# Patient Record
Sex: MALE | Race: WHITE | NOT HISPANIC OR LATINO | Employment: OTHER | URBAN - METROPOLITAN AREA
[De-identification: names, ages, dates, MRNs, and addresses within clinical notes are randomized per-mention and may not be internally consistent; named-entity substitution may affect disease eponyms.]

---

## 2017-07-07 ENCOUNTER — OFFICE VISIT (OUTPATIENT)
Dept: OPHTHALMOLOGY | Facility: CLINIC | Age: 67
End: 2017-07-07
Payer: COMMERCIAL

## 2017-07-07 DIAGNOSIS — H01.001 BLEPHARITIS OF UPPER EYELIDS OF BOTH EYES, UNSPECIFIED TYPE: ICD-10-CM

## 2017-07-07 DIAGNOSIS — H01.004 BLEPHARITIS OF UPPER EYELIDS OF BOTH EYES, UNSPECIFIED TYPE: ICD-10-CM

## 2017-07-07 DIAGNOSIS — Z83.518 FAMILY HISTORY OF MACULAR DEGENERATION: ICD-10-CM

## 2017-07-07 DIAGNOSIS — H52.03 HYPEROPIA, BILATERAL: ICD-10-CM

## 2017-07-07 DIAGNOSIS — H25.13 NUCLEAR SCLEROSIS OF BOTH EYES: Primary | ICD-10-CM

## 2017-07-07 DIAGNOSIS — H11.001 PTERYGIUM, RIGHT: ICD-10-CM

## 2017-07-07 DIAGNOSIS — Z46.0 ENCOUNTER FOR FITTING OR ADJUSTMENT OF SPECTACLES OR CONTACT LENSES: ICD-10-CM

## 2017-07-07 DIAGNOSIS — H52.4 BILATERAL PRESBYOPIA: ICD-10-CM

## 2017-07-07 PROCEDURE — 92014 COMPRE OPH EXAM EST PT 1/>: CPT | Mod: S$GLB,,, | Performed by: OPTOMETRIST

## 2017-07-07 PROCEDURE — 92310 CONTACT LENS FITTING OU: CPT | Mod: ,,, | Performed by: OPTOMETRIST

## 2017-07-07 PROCEDURE — 99999 PR PBB SHADOW E&M-EST. PATIENT-LVL II: CPT | Mod: PBBFAC,,, | Performed by: OPTOMETRIST

## 2017-07-07 PROCEDURE — 92015 DETERMINE REFRACTIVE STATE: CPT | Mod: S$GLB,,, | Performed by: OPTOMETRIST

## 2017-07-07 RX ORDER — NEOMYCIN SULFATE, POLYMYXIN B SULFATE AND DEXAMETHASONE 3.5; 10000; 1 MG/ML; [USP'U]/ML; MG/ML
1 SUSPENSION/ DROPS OPHTHALMIC 4 TIMES DAILY
Qty: 10 ML | Refills: 1 | Status: SHIPPED | OUTPATIENT
Start: 2017-07-07 | End: 2017-07-14

## 2017-07-07 NOTE — PROGRESS NOTES
HPI     Patient states right eye hurting with mucus in both eyes x 3-4 days. Pain   scale 3. Both eyes feels irritated. Last eye exam 06/17/2016 [facility].  Patient   wears contact lenses.     Last edited by Leslie Zuniga on 7/7/2017 11:09 AM. (History)            Assessment /Plan     For exam results, see Encounter Report.    Nuclear sclerosis of both eyes    Family history of macular degeneration    Pterygium, right    Blepharitis of upper eyelids of both eyes, unspecified type    Encounter for fitting or adjustment of spectacles or contact lenses    Hyperopia, bilateral    Bilateral presbyopia    Other orders  -     neomycin-polymyxin-dexamethasone (MAXITROL) 3.5mg/mL-10,000 unit/mL-0.1 % DrpS; Place 1 drop into both eyes 4 (four) times daily.  Dispense: 10 mL; Refill: 1      Minimal cataracts OU, not surgical    No macular pathology    Stable pterygium    Blepharitis to be treated with maxitrol qid OU, worksheet given for warm compresses and baby shampoo.    Discussed CL charges.  Dispense Final Rx for glasses  No changes CL Rx  RTC 1 year

## 2022-02-24 ENCOUNTER — TELEPHONE (OUTPATIENT)
Dept: HEMATOLOGY/ONCOLOGY | Facility: CLINIC | Age: 72
End: 2022-02-24
Payer: COMMERCIAL

## 2022-02-25 ENCOUNTER — TELEPHONE (OUTPATIENT)
Dept: HEMATOLOGY/ONCOLOGY | Facility: CLINIC | Age: 72
End: 2022-02-25
Payer: COMMERCIAL

## 2022-02-25 DIAGNOSIS — C83.30 DIFFUSE LARGE B-CELL LYMPHOMA, UNSPECIFIED BODY REGION: Primary | ICD-10-CM

## 2022-02-25 NOTE — LETTER
Fax Transmission   Date:   22      To:           JAM; PGL                       From:  Anila Copeland, BMT Lay Navigator   Fax:  [phone]                   Fax:      [phone]   Phone:    [phone]   Phone:  [phone] (Lay Navigator)               [phone] (Pathology lab)       TIME SENSITIVE - Appointment Scheduled for _____  IF THERE ARE ANY PROBLEMS WITH THIS TRANSMISSION, PLEASE CALL IMMEDIATELY. THANK YOU.    Patient:  Melvin Tariq__   _[id number]_Liawddvbo: DLCL    Specimen ID: _____________  Date Collected: _21_  Type of Specimen: _bone marrow__        Please send the following:   Entire case, including  o All glass slides   o 10 unstained slides of tumor   Pathology reports (to include results of cytogenetic, molecular studies, next gen sequencing, flow cytometry, FISH and other tests, if any)    PLEASE FAX PATH REPORT -[phone] AT THE TIME OF SHIPPING SPECIMEN    See attached [employer] label to use for shipping, tracking number ____________    Shipping to:  [facility] - URGENT SPECIMEN ALERT  [facility] 4th floor  [address] [place] [address]    CONFIDENTIALITY NOTICE: The accompanying facsimile is intended solely for the use of the recipient designated above. Document(s) transmitted herewith may contain information that is confidential and privileged. Delivery, distribution or dissemination of this communication other than to the intended recipient is strictly prohibited. If you have received this facsimile in error, please notify [facility]'s Corporate Integrity Department immediately by telephone at [phone].

## 2022-02-25 NOTE — TELEPHONE ENCOUNTER
----- Message from Colby Little sent at 2/24/2022 12:50 PM CST -----  Regarding: RE: SCT Benefits Check  Good afternoon Merary,    I have verified medical and transplant benefits for patient for Sal Patel.    Thanks  Colby     ----- Message -----  From: Merary Mcdonald RN  Sent: 2/24/2022  11:18 AM CST  To: Janusz Godoy, Colby Little  Subject: SCT Benefits Check                               Hello,    Please complete benefit check for patient. Medical benefit check and transplant benefit check for Sal Patel only.    Auto    Thank you,  Merary

## 2022-02-25 NOTE — LETTER
Date:   22    Department of Hematology/Oncology & BMT, Patient Navigation                         From:   Anila Copeland, BMT Lay Navigator [email or web address]                   Special Instructions:                         Please send the following:     ?   ? Imaging CD for recent PET scan     To  JAM         Fax:    [phone]    Phone:  [phone]                   Re:                     Name:   Melvin Thurman                        :  52                MRN#:   [id number]                            Number of Pages (including cover):     Phone: [phone] Fax: [phone]   Please mail CD imaging to:    Attention; Anila Copeland [address] [place] [address]                                                   If there are any problems with this transmission, please call immediately. Thank you.  Confidentiality notice: The accompanying facsimile is intended solely for the use of the recipient designated above. Document(s) transmitted herewith may contain information that is confidential and privileged. Delivery, distribution or dissemination of this communication other than to the intended recipient is strictly prohibited. If you have received this facsimile in error, please notify us immediately by telephone.

## 2022-02-28 ENCOUNTER — TELEPHONE (OUTPATIENT)
Dept: HEMATOLOGY/ONCOLOGY | Facility: CLINIC | Age: 72
End: 2022-02-28
Payer: COMMERCIAL

## 2022-02-28 DIAGNOSIS — C83.30 DIFFUSE LARGE B-CELL LYMPHOMA, UNSPECIFIED BODY REGION: Primary | ICD-10-CM

## 2022-02-28 NOTE — TELEPHONE ENCOUNTER
Pt confirmed integrative virtual appt 3/14 at 830. Pt just started new chemo. No side effects or concerns at this time. He is interested in holistic services. Has first appt with  this week.

## 2022-03-02 NOTE — PROGRESS NOTES
Oncology Nutrition Assessment for Medical Nutrition Therapy  Initial Visit    Melvin Thurman   1950    Referring Provider: Mariya Akins MD      Reason for Visit: nutrition counseling and education    The patient location is: home  The chief complaint leading to consultation is: nutrition referral    Visit type: audiovisual    Face to Face time with patient: 60 minutes  70 minutes of total time spent on the encounter, which includes face to face time and non-face to face time preparing to see the patient (eg, review of tests), Obtaining and/or reviewing separately obtained history, Documenting clinical information in the electronic or other health record, Independently interpreting results (not separately reported) and communicating results to the patient/family/caregiver, or Care coordination (not separately reported).     Each patient to whom he or she provides medical services by telemedicine is:  (1) informed of the relationship between the physician and patient and the respective role of any other health care provider with respect to management of the patient; and (2) notified that he or she may decline to receive medical services by telemedicine and may withdraw from such care at any time.    PMHx:   Past Medical History:   Diagnosis Date    Pterygium of right eye        Nutrition Assessment    This is a 71 y.o.male with a medical diagnosis of DLBCL s/p 6 cycles R-CHOP. He reports that he just started salvage chemo and met with Dr. Akins this morning to discuss plan (CAR T vs. SCT). He reports that overall his appetite is good. He eats 3 meals/day and snacks periodically. His meals are usually home prepared by his wife. They are very focused on overall healthy eating (avoiding processed foods and artificial sweeteners, limiting overall carbohydrate intake, choosing whole grains, avoiding refined sugar). He has protein with most meals, usually eggs, red meat, chicken, or seafood. He is well aware of food  "safety guidelines and is following these. He also snacks on peanuts. He drinks mostly water and some Roar organic drinks (coconut water based). He has been supplementing with Ensure and Ensure Plus some after he was introduced to them in the hospital. He does also make smoothies with whey protein, frozen fruit, and walnuts.    Weight: 93.3 kg (205 lb 9.6 oz) - 2/28 clinic note  Height: 6' 5.01" (1.956 m)  BMI: 24.37    Usual BW: 205lb  Weight Change: stable    Allergies: Patient has no known allergies.    Current Medications:  Current Outpatient Medications:     acyclovir (ZOVIRAX) 800 MG Tab, Take 0.5 tablets by mouth 2 (two) times daily., Disp: 60 tablet, Rfl: 5    acyclovir (ZOVIRAX) 800 MG Tab, Take 0.5 tablets by mouth 2 (two) times daily., Disp: 60 tablet, Rfl: 5    allopurinoL (ZYLOPRIM) 300 MG tablet, Take 1 tablet by mouth daily., Disp: 60 tablet, Rfl: 0    allopurinoL (ZYLOPRIM) 300 MG tablet, Take 1 tablet by mouth daily., Disp: 60 tablet, Rfl: 0    bisacodyL (DULCOLAX) 5 mg EC tablet, Take 2 tablets by mouth 2 (two) times daily as needed for Constipation., Disp: 60 tablet, Rfl: 0    ciprofloxacin HCl (CIPRO) 500 MG tablet, Take 1 tablet by mouth 2 (two) times daily for 7 days., Disp: 14 tablet, Rfl: 0    dexAMETHasone (DECADRON) 4 MG Tab, Take 10 tablets by mouth Take daily with breakfast. For days starting with chemo, Disp: 40 tablet, Rfl: 2    diazePAM (VALIUM) 5 MG tablet, Take 1 (one) tablet by mouth single dose as directed 30 minutes before scan, Disp: 4 tablet, Rfl: 0    gabapentin (NEURONTIN) 100 MG capsule, Take 1 capsule by mouth three times a day with meals, Disp: 90 capsule, Rfl: 3    HYDROcodone-acetaminophen (NORCO) 5-325 mg per tablet, Take 1 tablet by mouth every 6 (six) hours as needed., Disp: 30 tablet, Rfl: 0    levoFLOXacin (LEVAQUIN) 500 MG tablet, Take 1 tablet by mouth daily for 10 days., Disp: 10 tablet, Rfl: 0    levoFLOXacin (LEVAQUIN) 750 MG tablet, Take 1 tablet by " mouth daily for 5 days., Disp: 5 tablet, Rfl: 0    LIDOcaine-prilocaine (EMLA) cream, Apply topically once prior to mediport use., Disp: 30 g, Rfl: 0    ondansetron (ZOFRAN-ODT) 4 MG TbDL, Take 1 tablet by mouth every 8 (eight) hours as needed for Nausea, Disp: 20 tablet, Rfl: 3    pantoprazole (PROTONIX) 40 MG tablet, Take 1 tablet by mouth every morning., Disp: 30 tablet, Rfl: 11    pantoprazole (PROTONIX) 40 MG tablet, Take 1 tablet by mouth every morning., Disp: 30 tablet, Rfl: 3    pantoprazole (PROTONIX) 40 MG tablet, Take 1 tablet by mouth every morning., Disp: 30 tablet, Rfl: 3    predniSONE (DELTASONE) 50 MG Tab, Take 2 tablets by mouth daily., Disp: 10 tablet, Rfl: 5    predniSONE (DELTASONE) 50 MG Tab, Take 2 tablets by mouth daily., Disp: 10 tablet, Rfl: 5    pregabalin (LYRICA) 100 MG capsule, Take 1 capsule by mouth twice a day, Disp: 60 capsule, Rfl: 3    pregabalin (LYRICA) 50 MG capsule, Take 1 capsule by mouth 2 (two) times daily., Disp: 60 capsule, Rfl: 3    pregabalin (LYRICA) 50 MG capsule, Take 1 capsule by mouth 2 (two) times daily., Disp: 60 capsule, Rfl: 3    promethazine (PHENERGAN) 25 MG tablet, Take 1 tablet by mouth every 6 hours as needed for nausea for up to 7 days, Disp: 30 tablet, Rfl: 0    sulfamethoxazole-trimethoprim 800-160mg (BACTRIM DS) 800-160 mg Tab, Take 1 tablet by mouth 3 (three) times a week., Disp: 12 tablet, Rfl: 5    sulfamethoxazole-trimethoprim 800-160mg (BACTRIM DS) 800-160 mg Tab, Take 1 tablet by mouth 3 (three) times a week., Disp: 12 tablet, Rfl: 5    Food/medication interactions noted: none    Vitamins/Supplements: krill oil, CoQ10, magnesium, folate, zinc, selenium, beta-carotene    Labs: Reviewed    Nutrition Diagnosis    Problem: nutrition-related knowledge deficit  Etiology (related to): lack of prior need for nutrition education  Signs/Symptoms (as evidenced by): DLBCL diagnosis    Nutrition Intervention    Nutrition Prescription   2333 kcals  (25kcal/kg)  93g protein (1g/kg)   2333mL fluid (25mL/kg)    Recommendations:  Continue to eat 3 meals/day and snacks  Continue to include protein at all meals/snacks  Continue to drink at least 64oz fluid/day  Addressed several questions and concerns  Discussed overall healthy diet - plant-based, lean meats, increased fruits and vegetables, etc.  Reinforced food safety guidelines    Nutrition Monitoring and Evaluation    Monitor: diet education needs, energy intake and weight status    Goals: weight maintenance    Follow up: Patient provided with dietitian contact information and advised to call/message with questions or to make future appointment if further intervention is needed.    Communication to referring provider/care team: note available in chart    eClia Silverio MS, RD, LDN  [phone]

## 2022-03-03 ENCOUNTER — CLINICAL SUPPORT (OUTPATIENT)
Dept: HEMATOLOGY/ONCOLOGY | Facility: CLINIC | Age: 72
End: 2022-03-03
Payer: COMMERCIAL

## 2022-03-03 ENCOUNTER — OFFICE VISIT (OUTPATIENT)
Dept: HEMATOLOGY/ONCOLOGY | Facility: CLINIC | Age: 72
End: 2022-03-03
Payer: COMMERCIAL

## 2022-03-03 DIAGNOSIS — C83.30 DIFFUSE LARGE B-CELL LYMPHOMA, UNSPECIFIED BODY REGION: Primary | ICD-10-CM

## 2022-03-03 DIAGNOSIS — Z71.3 NUTRITIONAL COUNSELING: ICD-10-CM

## 2022-03-03 PROCEDURE — 99205 PR OFFICE/OUTPT VISIT, NEW, LEVL V, 60-74 MIN: ICD-10-PCS | Mod: 95,,, | Performed by: INTERNAL MEDICINE

## 2022-03-03 PROCEDURE — 97802 PR MED NUTR THER, 1ST, INDIV, EA 15 MIN: ICD-10-PCS | Mod: 95,,, | Performed by: DIETITIAN, REGISTERED

## 2022-03-03 PROCEDURE — 97802 MEDICAL NUTRITION INDIV IN: CPT | Mod: 95,,, | Performed by: DIETITIAN, REGISTERED

## 2022-03-03 PROCEDURE — 99205 OFFICE O/P NEW HI 60 MIN: CPT | Mod: 95,,, | Performed by: INTERNAL MEDICINE

## 2022-03-04 NOTE — PROGRESS NOTES
Subjective:       Patient ID: Melvin Thurman is a 71 y.o. male.    Chief Complaint: Lymphoma    HPI     New patient visit for Diffuse Large B Cell Lymphoma, Stage IV non-germinal. Presented with pain of the chest, hip, and shoulder. He required inpatient stay September 2021 for hypercalcemia, hyperuricemia, acute renal failure, and anemia treated with supportive care, zometa, and transfusions. Core needle biopsy of a chest wall mass 9/13/21 achieved diagnosis of non germinal center DLBCL.  RCHOP chemotherapy started inpatient 9/22/2021 and completed 1/5/2022. Intrathecal methotrexate was given 10/14/21, 11/4/21, and 11/23/21. Marrow and CSF negative.  PET 2/16/2022 showed a new sternal lytic lesion with increased SUV activity. Salvage therapy with Carboplatin/Gemcitabine/Dexamethasone initiated with plan for 3 cycles, PET scan after cycle 2.    Reports tolerating RCHOP chemotherapy, first 3 cycles without side effect, last 3 with severe fatigue. No other medical history of malignancy. Significant family history for lymphoma and gliobastoma.    Review of Systems   Constitutional: Positive for fever.   HENT: Negative.    Eyes: Negative.    Respiratory: Negative.    Cardiovascular: Positive for chest pain.   Gastrointestinal: Negative.    Endocrine: Negative.    Genitourinary: Negative.    Integumentary:  Negative.   Allergic/Immunologic: Negative for environmental allergies and food allergies.   Neurological: Negative.    Hematological: Negative for adenopathy. Does not bruise/bleed easily.   Psychiatric/Behavioral: Negative.          Objective:    telehealth visit  Physical Exam    Assessment:       Problem List Items Addressed This Visit    None     Visit Diagnoses     Diffuse large B-cell lymphoma, unspecified body region    -  Primary          Plan:       Discussed with patient new role of CAR-T cell therapy as salvage consolidation (Zuma 7 trial). He has evidence of chemotherapy refractory large cell lymphoma  with relapse less than 3 months after completing therapy. If response to salvage carbo-gem-dex is incomplete or partial, CART cell consultation at [facility] will be indicated for chemotherapy refractory DLCBL. If response to salvage chemotherapy is complete will need to consider role of ASCT vs CAR-T. He has support systems in both [place] and [facility] therefore both options are feasible. We discussed the mechanics of both immune therapies including cell collection, hospitalization, side effect profile and recovery. Will both refer to [facility] for CART consultation and present at [facility] BMT transplant committee meeting to be prepared for either course of consolidative immune therapy. Follow-up after mid-treatment PET imaging.    Total visit 1 hour, >50% counseling

## 2022-03-10 ENCOUNTER — LAB VISIT (OUTPATIENT)
Dept: LAB | Facility: HOSPITAL | Age: 72
End: 2022-03-10
Attending: INTERNAL MEDICINE
Payer: COMMERCIAL

## 2022-03-10 DIAGNOSIS — C83.30 DIFFUSE LARGE B-CELL LYMPHOMA, UNSPECIFIED BODY REGION: ICD-10-CM

## 2022-03-10 PROCEDURE — 88325 CONSLTJ COMPRE RVW REC REPRT: CPT | Mod: ,,, | Performed by: PATHOLOGY

## 2022-03-10 PROCEDURE — 88321 CONSLTJ&REPRT SLD PREP ELSWR: CPT | Performed by: PATHOLOGY

## 2022-03-10 PROCEDURE — 88325 PR  COMPREHENSIVE REVIEW OF DATA: ICD-10-PCS | Mod: ,,, | Performed by: PATHOLOGY

## 2022-03-11 LAB
COMMENT: NORMAL
FINAL PATHOLOGIC DIAGNOSIS: NORMAL
Lab: NORMAL
MICROSCOPIC EXAM: NORMAL

## 2022-04-04 ENCOUNTER — PATIENT MESSAGE (OUTPATIENT)
Dept: HEMATOLOGY/ONCOLOGY | Facility: CLINIC | Age: 72
End: 2022-04-04
Payer: COMMERCIAL

## 2022-07-28 ENCOUNTER — TELEPHONE (OUTPATIENT)
Dept: OPHTHALMOLOGY | Facility: CLINIC | Age: 72
End: 2022-07-28
Payer: COMMERCIAL

## 2022-07-28 NOTE — TELEPHONE ENCOUNTER
I called and Spoke to patient worked him in 8/1/22 in [place] to see TRF .   ----- Message from Yoko Alberto sent at 7/28/2022  9:15 AM CDT -----  Pt called in re: appt access with Dr. English on next week please. Call pt @.[phone]      [facility]

## 2022-08-01 ENCOUNTER — OFFICE VISIT (OUTPATIENT)
Dept: OPHTHALMOLOGY | Facility: CLINIC | Age: 72
End: 2022-08-01
Payer: COMMERCIAL

## 2022-08-01 ENCOUNTER — PATIENT MESSAGE (OUTPATIENT)
Dept: OPHTHALMOLOGY | Facility: CLINIC | Age: 72
End: 2022-08-01
Payer: COMMERCIAL

## 2022-08-01 DIAGNOSIS — H47.10 PAPILLEDEMA OF BOTH EYES: Primary | ICD-10-CM

## 2022-08-01 DIAGNOSIS — Z46.0 ENCOUNTER FOR FITTING OR ADJUSTMENT OF SPECTACLES OR CONTACT LENSES: ICD-10-CM

## 2022-08-01 DIAGNOSIS — H52.7 REFRACTIVE ERRORS: ICD-10-CM

## 2022-08-01 DIAGNOSIS — H11.003 PTERYGIUM OF BOTH EYES: ICD-10-CM

## 2022-08-01 PROCEDURE — 1159F MED LIST DOCD IN RCRD: CPT | Mod: CPTII,S$GLB,, | Performed by: OPTOMETRIST

## 2022-08-01 PROCEDURE — 1159F PR MEDICATION LIST DOCUMENTED IN MEDICAL RECORD: ICD-10-PCS | Mod: CPTII,S$GLB,, | Performed by: OPTOMETRIST

## 2022-08-01 PROCEDURE — 99999 PR PBB SHADOW E&M-EST. PATIENT-LVL III: CPT | Mod: PBBFAC,,, | Performed by: OPTOMETRIST

## 2022-08-01 PROCEDURE — 99999 PR PBB SHADOW E&M-EST. PATIENT-LVL III: ICD-10-PCS | Mod: PBBFAC,,, | Performed by: OPTOMETRIST

## 2022-08-01 PROCEDURE — 92002 PR EYE EXAM, NEW PATIENT,INTERMED: ICD-10-PCS | Mod: S$GLB,,, | Performed by: OPTOMETRIST

## 2022-08-01 PROCEDURE — 92002 INTRM OPH EXAM NEW PATIENT: CPT | Mod: S$GLB,,, | Performed by: OPTOMETRIST

## 2022-08-01 PROCEDURE — 92015 DETERMINE REFRACTIVE STATE: CPT | Mod: S$GLB,,, | Performed by: OPTOMETRIST

## 2022-08-01 PROCEDURE — 92310 PR CONTACT LENS FITTING (NO CHANGE): ICD-10-PCS | Mod: CSM,,, | Performed by: OPTOMETRIST

## 2022-08-01 PROCEDURE — 92015 PR REFRACTION: ICD-10-PCS | Mod: S$GLB,,, | Performed by: OPTOMETRIST

## 2022-08-01 PROCEDURE — 92310 CONTACT LENS FITTING OU: CPT | Mod: CSM,,, | Performed by: OPTOMETRIST

## 2022-08-01 NOTE — PROGRESS NOTES
HPI     No visual complaints.  Patient states he has been seeing ophthalmologist in [place] for   papilledema.  Last eye exam 07/07/2017 [facility].  Update glasses RX.      Last edited by Cm English, OD on 8/1/2022  9:15 AM. (History)            Assessment /Plan     For exam results, see Encounter Report.    Papilledema of both eyes    Encounter for fitting or adjustment of spectacles or contact lenses    Pterygium of both eyes    Refractive errors      Followed by Tona Raines MD, notes in Epic     SCL wear, admittedly over wears contacts.  Right eye irritated by contacts, will change to non-daily disposable since he wears for several days.  Irritation may be caused by bilateral pterygia. Uses AT prn    Discussed CL charges.  Dispense Final Rx for glasses  Note changes CL Rx  RTC 1 year  Discussed above and answered questions.

## 2023-12-07 ENCOUNTER — OFFICE VISIT (OUTPATIENT)
Dept: OPHTHALMOLOGY | Facility: CLINIC | Age: 73
End: 2023-12-07
Payer: COMMERCIAL

## 2023-12-07 DIAGNOSIS — H16.002 CORNEA ULCER, LEFT: Primary | ICD-10-CM

## 2023-12-07 PROCEDURE — 1159F PR MEDICATION LIST DOCUMENTED IN MEDICAL RECORD: ICD-10-PCS | Mod: CPTII,S$GLB,, | Performed by: OPTOMETRIST

## 2023-12-07 PROCEDURE — 1159F MED LIST DOCD IN RCRD: CPT | Mod: CPTII,S$GLB,, | Performed by: OPTOMETRIST

## 2023-12-07 PROCEDURE — 92012 INTRM OPH EXAM EST PATIENT: CPT | Mod: S$GLB,,, | Performed by: OPTOMETRIST

## 2023-12-07 PROCEDURE — 99999 PR PBB SHADOW E&M-EST. PATIENT-LVL II: CPT | Mod: PBBFAC,,, | Performed by: OPTOMETRIST

## 2023-12-07 PROCEDURE — 99999 PR PBB SHADOW E&M-EST. PATIENT-LVL II: ICD-10-PCS | Mod: PBBFAC,,, | Performed by: OPTOMETRIST

## 2023-12-07 PROCEDURE — 92012 PR EYE EXAM, EST PATIENT,INTERMED: ICD-10-PCS | Mod: S$GLB,,, | Performed by: OPTOMETRIST

## 2023-12-07 RX ORDER — MOXIFLOXACIN 5 MG/ML
SOLUTION/ DROPS OPHTHALMIC
Qty: 6 ML | Refills: 3 | Status: SHIPPED | OUTPATIENT
Start: 2023-12-07 | End: 2023-12-16

## 2023-12-07 NOTE — PROGRESS NOTES
SUBJECTIVE  Melvin Thurman is 73 y.o. male  Uncorrected distance visual acuity was not recorded in the right eye and 20/200 in the left eye. Corrected distance visual acuity was 20/30 -2 in the right eye and not recorded in the left eye.   Chief Complaint   Patient presents with    Eye Pain          HPI    Left eye pain x 1 day.  Left eye  irritated when patient took contact lens out.  Last eye exam 08/01/2022 TRF.  Last edited by Leslie Zuniga MA on 12/7/2023  1:20 PM.         Assessment /Plan :  1. Cornea ulcer, left   Minimal K stain over <1mm infiltrates, marginal K ulcer     Vigamox q1h and wean over weekend  RTC tomorrow if pain or vision worsens.    RTC Monday to recheck

## 2023-12-11 ENCOUNTER — OFFICE VISIT (OUTPATIENT)
Dept: OPHTHALMOLOGY | Facility: CLINIC | Age: 73
End: 2023-12-11
Payer: COMMERCIAL

## 2023-12-11 DIAGNOSIS — H16.002 CORNEA ULCER, LEFT: Primary | ICD-10-CM

## 2023-12-11 PROCEDURE — 99999 PR PBB SHADOW E&M-EST. PATIENT-LVL II: ICD-10-PCS | Mod: PBBFAC,,, | Performed by: OPTOMETRIST

## 2023-12-11 PROCEDURE — 1159F MED LIST DOCD IN RCRD: CPT | Mod: CPTII,S$GLB,, | Performed by: OPTOMETRIST

## 2023-12-11 PROCEDURE — 92012 INTRM OPH EXAM EST PATIENT: CPT | Mod: S$GLB,,, | Performed by: OPTOMETRIST

## 2023-12-11 PROCEDURE — 92012 PR EYE EXAM, EST PATIENT,INTERMED: ICD-10-PCS | Mod: S$GLB,,, | Performed by: OPTOMETRIST

## 2023-12-11 PROCEDURE — 99999 PR PBB SHADOW E&M-EST. PATIENT-LVL II: CPT | Mod: PBBFAC,,, | Performed by: OPTOMETRIST

## 2023-12-11 PROCEDURE — 1159F PR MEDICATION LIST DOCUMENTED IN MEDICAL RECORD: ICD-10-PCS | Mod: CPTII,S$GLB,, | Performed by: OPTOMETRIST

## 2023-12-11 NOTE — PROGRESS NOTES
SUBJECTIVE  Melvin Thurman is 73 y.o. male  Uncorrected distance visual acuity was not recorded in the right eye and 20/400 in the left eye. Corrected distance visual acuity was 20/20 in the right eye and not recorded in the left eye.   No chief complaint on file.         HPI    3 day f/u K ulcer OS  Mild irritating, a lot better than last week.  Taking Ciloxan qid OS  Last edited by Cm English, OD on 12/11/2023 10:40 AM.         Assessment /Plan :  1. Cornea ulcer, left      Resolving K ucer OS, no epi defect, infiltrates still present  Wean Ciloxan to bid x 5 days  Offered PF is irritation continues, pt deferred.    RTC 1 month for annual exam

## 2024-05-16 ENCOUNTER — PATIENT MESSAGE (OUTPATIENT)
Dept: OPHTHALMOLOGY | Facility: CLINIC | Age: 74
End: 2024-05-16

## 2024-05-16 ENCOUNTER — OFFICE VISIT (OUTPATIENT)
Dept: OPHTHALMOLOGY | Facility: CLINIC | Age: 74
End: 2024-05-16
Payer: COMMERCIAL

## 2024-05-16 DIAGNOSIS — H52.7 REFRACTIVE ERRORS: ICD-10-CM

## 2024-05-16 DIAGNOSIS — Z46.0 ENCOUNTER FOR FITTING OR ADJUSTMENT OF SPECTACLES OR CONTACT LENSES: ICD-10-CM

## 2024-05-16 DIAGNOSIS — H11.003 PTERYGIUM OF BOTH EYES: Primary | ICD-10-CM

## 2024-05-16 PROCEDURE — 1159F MED LIST DOCD IN RCRD: CPT | Mod: CPTII,S$GLB,, | Performed by: OPTOMETRIST

## 2024-05-16 PROCEDURE — 92015 DETERMINE REFRACTIVE STATE: CPT | Mod: S$GLB,,, | Performed by: OPTOMETRIST

## 2024-05-16 PROCEDURE — 92014 COMPRE OPH EXAM EST PT 1/>: CPT | Mod: S$GLB,,, | Performed by: OPTOMETRIST

## 2024-05-16 PROCEDURE — 99999 PR PBB SHADOW E&M-EST. PATIENT-LVL IV: CPT | Mod: PBBFAC,,, | Performed by: OPTOMETRIST

## 2024-05-16 NOTE — PROGRESS NOTES
SUBJECTIVE  Melvin Thurman is 73 y.o. male  Corrected distance visual acuity was 20/30 -1 in the right eye and 20/30 -2 in the left eye. Corrected near visual acuity was J7 in the right eye and J5 in the left eye.   Chief Complaint   Patient presents with    Eye Exam    Contact Lens Follow Up          HPI    No visual complaints  Patient uses Refresh eye drops for contact lenses.  Last eye visit 12/11/2023 TRF.  Last eye exam 08/01/2022 [facility].  Last dilation 07/07/2017.  Update glasses and contact lenses RX  Last edited by Leslie Zuniga MA on 5/16/2024  9:40 AM.         Assessment /Plan :  1. Pterygium of both eyes   Stable     2. Encounter for fitting or adjustment of spectacles or contact lenses   No changes CL Rx  Dispense Final Rx for contacts.     3. Refractive errors   Dispense Final Rx for glasses.  RTC 1 year  Discussed above and answered questions.